# Patient Record
Sex: FEMALE | Race: WHITE
[De-identification: names, ages, dates, MRNs, and addresses within clinical notes are randomized per-mention and may not be internally consistent; named-entity substitution may affect disease eponyms.]

---

## 2018-08-08 ENCOUNTER — HOSPITAL ENCOUNTER (EMERGENCY)
Dept: HOSPITAL 31 - C.ER | Age: 38
LOS: 1 days | Discharge: HOME | End: 2018-08-09
Payer: COMMERCIAL

## 2018-08-08 DIAGNOSIS — R07.89: Primary | ICD-10-CM

## 2018-08-08 PROCEDURE — 71046 X-RAY EXAM CHEST 2 VIEWS: CPT

## 2018-08-08 PROCEDURE — 96374 THER/PROPH/DIAG INJ IV PUSH: CPT

## 2018-08-08 PROCEDURE — 80053 COMPREHEN METABOLIC PANEL: CPT

## 2018-08-08 PROCEDURE — 84484 ASSAY OF TROPONIN QUANT: CPT

## 2018-08-08 PROCEDURE — 99284 EMERGENCY DEPT VISIT MOD MDM: CPT

## 2018-08-08 PROCEDURE — 93005 ELECTROCARDIOGRAM TRACING: CPT

## 2018-08-08 PROCEDURE — 85025 COMPLETE CBC W/AUTO DIFF WBC: CPT

## 2018-08-08 NOTE — C.PDOC
History Of Present Illness


37 year old female presents to the ER with a complaint of left parasternal pain 

that began this afternoon. Patient states the pain is increased with movement 

and palpation. Denies fever or SOB.


Chief Complaint (Nursing): Chest Pain


History Per: Patient


History/Exam Limitations: no limitations


Onset/Duration Of Symptoms: Hrs


Current Symptoms Are (Timing): Still Present


Associated Symptoms: denies: Dyspnea, Other (Fever)


Modifying Factors: None


Exacerbating Factors: Movement, Other (Palpation)


Alleviating Factors: None


Recent travel outside of the [place]: No





Past Medical History


Reviewed: Historical Data, Nursing Documentation, Vital Signs


Vital Signs: 


 Last Vital Signs











Temp  97.9 F   18 23:45


 


Pulse  64   18 23:45


 


Resp  18   18 23:45


 


BP  139/69   18 23:45


 


Pulse Ox  98   18 00:50











Family History: States: Unknown Family Hx





- Social History


Hx Alcohol Use: Yes


Hx Substance Use: No





- Immunization History


Hx Tetanus Toxoid Vaccination: No


Hx Influenza Vaccination: No


Hx Pneumococcal Vaccination: No





Review Of Systems


Constitutional: Negative for: Fever, Chills


Respiratory: Negative for: Shortness of Breath


Gastrointestinal: Negative for: Nausea, Vomiting


Musculoskeletal: Positive for: Other (Chest wall pain)





Physical Exam





- Physical Exam


Appears: Non-toxic, No Acute Distress


Skin: Normal Color, Warm, Dry


Head: Atraumatic, Normacephalic


Eye(s): bilateral: Normal Inspection


Oral Mucosa: Moist


Neck: Normal, Supple


Chest: Tenderness (Left parasternal area)


Cardiovascular: Rhythm Regular


Respiratory: Normal Breath Sounds, No Rales, No Rhonchi, No Wheezing


Gastrointestinal/Abdominal: Soft, No Tenderness


Neurological/Psych: Oriented x3, Normal Speech





ED Course And Treatment





- Laboratory Results


Result Diagrams: 


 18 00:00





 18 00:00


ECG: Interpreted By Me, Viewed By Me


ECG Rhythm: Sinus Rhythm


ECG Interpretation: Normal, No Acute Changes


Interpretation Of ECG: NSR, normal tracings.


Rate From EC


O2 Sat by Pulse Oximetry: 98


Pulse Ox Interpretation: Normal


Progress Note: EKG, CXR, and toradol administered.





Disposition


Counseled Patient/Family Regarding: Diagnosis





- Disposition


Referrals: 


[facility] [Outside]


Disposition Time: 00:51


Condition: STABLE


Prescriptions: 


Naproxen 375 mg PO Q6 #20 tablet


Instructions:  Costochondritis


Forms:  [facility] (English)





- POA


Present On Arrival: None





- Clinical Impression


Clinical Impression: 


 Chest wall pain








- Scribe Statement


The provider has reviewed the documentation as recorded by the Scribe





Cedric Sandy





All medical record entries made by the Scribe were at my direction and 

personally dictated by me. I have reviewed the chart and agree that the record 

accurately reflects my personal performance of the history, physical exam, 

medical decision making, and the department course for this patient. I have 

also personally directed, reviewed, and agree with the discharge instructions 

and disposition.

## 2018-08-09 VITALS
TEMPERATURE: 98.2 F | DIASTOLIC BLOOD PRESSURE: 60 MMHG | SYSTOLIC BLOOD PRESSURE: 97 MMHG | OXYGEN SATURATION: 99 % | RESPIRATION RATE: 20 BRPM | HEART RATE: 61 BPM

## 2018-08-09 LAB
ALBUMIN SERPL-MCNC: 4 [, G/DL] (ref 3.5–5)
ALBUMIN/GLOB SERPL: 1.4 [,] (ref 1–2.1)
ALT SERPL-CCNC: 25 [, U/L] (ref 9–52)
AST SERPL-CCNC: 17 [, U/L] (ref 14–36)
BASOPHILS # BLD AUTO: 0.1 [, K/UL] (ref 0–0.2)
BASOPHILS NFR BLD: 0.9 [, %] (ref 0–2)
BUN SERPL-MCNC: 10 [, MG/DL] (ref 7–17)
CALCIUM SERPL-MCNC: 9.2 [, MG/DL] (ref 8.6–10.4)
EOSINOPHIL # BLD AUTO: 0.2 [, K/UL] (ref 0–0.7)
EOSINOPHIL NFR BLD: 2 [, %] (ref 0–4)
ERYTHROCYTE [DISTWIDTH] IN BLOOD BY AUTOMATED COUNT: 13.9 [, %] (ref 11.5–14.5)
GFR NON-AFRICAN AMERICAN: > 60 [,]
HGB BLD-MCNC: 11.1 [, G/DL] (ref 11–16)
LYMPHOCYTES # BLD AUTO: 2.4 [, K/UL] (ref 1–4.3)
LYMPHOCYTES NFR BLD AUTO: 28.5 [, %] (ref 20–40)
MCH RBC QN AUTO: 28.5 [, PG] (ref 27–31)
MCHC RBC AUTO-ENTMCNC: 33.6 [, G/DL] (ref 33–37)
MCV RBC AUTO: 84.7 [, FL] (ref 81–99)
MONOCYTES # BLD: 0.6 [, K/UL] (ref 0–0.8)
MONOCYTES NFR BLD: 7.6 [, %] (ref 0–10)
NEUTROPHILS # BLD: 5.1 [, K/UL] (ref 1.8–7)
NEUTROPHILS NFR BLD AUTO: 61 [, %] (ref 50–75)
NRBC BLD AUTO-RTO: 0 [, %] (ref 0–2)
PLATELET # BLD: 231 [, K/UL] (ref 130–400)
PMV BLD AUTO: 9.6 [, FL] (ref 7.2–11.7)
RBC # BLD AUTO: 3.9 [, MIL/UL] (ref 3.8–5.2)
WBC # BLD AUTO: 8.4 [, K/UL] (ref 4.8–10.8)

## 2018-08-09 NOTE — RAD
Date of service: 



08/09/2018



HISTORY:

chest pain  



COMPARISON:

No prior.



TECHNIQUE:

Chest PA and lateral



FINDINGS:



LUNGS:

No active pulmonary disease.



PLEURA:

No significant pleural effusion identified. No pneumothorax apparent.



CARDIOVASCULAR:

Normal.



OSSEOUS STRUCTURES:

Scoliosis



VISUALIZED UPPER ABDOMEN:

Normal.



OTHER FINDINGS:

None.



IMPRESSION:

No acute cardiopulmonary pathology.



Scoliosis

## 2018-08-12 NOTE — CARD
--------------- APPROVED REPORT --------------





Date of service: 08/08/2018



EKG Measurement

Heart [id number]

[id number]

[id number]

[id number]

[id number]



<Conclusion>

Normal sinus rhythm

Normal ECG